# Patient Record
Sex: FEMALE | Race: WHITE | NOT HISPANIC OR LATINO
[De-identification: names, ages, dates, MRNs, and addresses within clinical notes are randomized per-mention and may not be internally consistent; named-entity substitution may affect disease eponyms.]

---
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
## 2019-04-23 ENCOUNTER — RX RENEWAL (OUTPATIENT)
Age: 19
End: 2019-04-23

## 2019-05-23 ENCOUNTER — RX RENEWAL (OUTPATIENT)
Age: 19
End: 2019-05-23

## 2019-06-04 ENCOUNTER — APPOINTMENT (OUTPATIENT)
Dept: PEDIATRIC NEUROLOGY | Facility: CLINIC | Age: 19
End: 2019-06-04
Payer: COMMERCIAL

## 2019-06-04 VITALS
HEART RATE: 61 BPM | BODY MASS INDEX: 18.41 KG/M2 | SYSTOLIC BLOOD PRESSURE: 101 MMHG | HEIGHT: 60.24 IN | WEIGHT: 95.02 LBS | DIASTOLIC BLOOD PRESSURE: 68 MMHG

## 2019-06-04 DIAGNOSIS — G40.909 EPILEPSY, UNSPECIFIED, NOT INTRACTABLE, W/OUT STATUS EPILEPTICUS: ICD-10-CM

## 2019-06-04 DIAGNOSIS — F41.9 ANXIETY DISORDER, UNSPECIFIED: ICD-10-CM

## 2019-06-04 DIAGNOSIS — F84.9 PERVASIVE DEVELOPMENTAL DISORDER, UNSPECIFIED: ICD-10-CM

## 2019-06-04 DIAGNOSIS — F90.9 ATTENTION-DEFICIT HYPERACTIVITY DISORDER, UNSPECIFIED TYPE: ICD-10-CM

## 2019-06-04 PROCEDURE — 99214 OFFICE O/P EST MOD 30 MIN: CPT

## 2019-06-04 RX ORDER — DROSPIRENONE AND ETHINYL ESTRADIOL 0.02-3(28)
3-0.02 KIT ORAL
Refills: 0 | Status: DISCONTINUED | COMMUNITY
End: 2019-06-04

## 2019-06-04 RX ORDER — CALCIUM LACTATE 100 MG
100 TABLET ORAL DAILY
Qty: 30 | Refills: 0 | Status: DISCONTINUED | COMMUNITY
Start: 2017-05-23 | End: 2019-06-04

## 2019-06-04 RX ORDER — RISPERIDONE 1 MG/1
1 TABLET, FILM COATED ORAL
Qty: 60 | Refills: 0 | Status: ACTIVE | COMMUNITY
Start: 2018-12-11

## 2019-06-04 RX ORDER — AMOXICILLIN 400 MG/5ML
400 FOR SUSPENSION ORAL
Qty: 200 | Refills: 0 | Status: DISCONTINUED | COMMUNITY
Start: 2019-02-04

## 2019-06-05 PROBLEM — F90.9 ADHD: Status: ACTIVE | Noted: 2018-07-27

## 2019-06-05 PROBLEM — F84.9 PDD (PERVASIVE DEVELOPMENTAL DISORDER): Status: ACTIVE | Noted: 2017-01-31

## 2019-06-05 PROBLEM — G40.909 EPILEPSY: Status: ACTIVE | Noted: 2017-01-31

## 2019-06-05 PROBLEM — F41.9 ANXIETY: Status: ACTIVE | Noted: 2018-07-27

## 2019-06-05 RX ORDER — RISPERIDONE 0.5 MG/1
TABLET ORAL
Refills: 0 | Status: DISCONTINUED | COMMUNITY
End: 2019-06-05

## 2019-06-05 NOTE — ASSESSMENT
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 No reports of seizure acitvity- EEG in 8/2018 normal.  Depakote was also felt to have mood stabilizing benefits. After weaning had a worsening in behavior which has now stabilized and psychiatry is recommending transition to Lamictal from VPA. Discussed with father slow transition from VPA to LTG as well as side effects of both when used in combination. \par \par Plan:\par Will continue on same dose of 500 mg AM/250 mg PM\par Recommend starting Lamictal per psych but advise to start slow ( 25mg every other day x 2 week, 25 mg daily x 2 weeks.  Assess levels- if therapeutic may continue taper of VPA 250mg every 2 weeks.  Increase Lamictal to maintenance dose of 100mg daily). \par Recommend monitoring ferritin levels.  If rash develops stop immediately. \par Continue Vit D and folic acid\par RTC 6 months\par

## 2019-06-05 NOTE — QUALITY MEASURES
[Seizure frequency] : Seizure frequency: Yes [Etiology, seizure type, and epilepsy syndrome] : Etiology, seizure type, and epilepsy syndrome: Yes [Side effects of anti-seizure medications] : Side effects of anti-seizure medications: Yes [Safety and education around seizures] : Safety and education around seizures: Yes [Screening for anxiety, depression] : Screening for anxiety, depression: Yes [Treatment-resistant epilepsy (every visit)] : Treatment-resistant epilepsy (every visit): Yes [Adherence to medication(s)] : Adherence to medication(s): Yes [Counseling for women of childbearing potential with epilepsy (including folic acid supplement)] : Counseling for women of childbearing potential with epilepsy (including folic acid supplement): Yes [Options for adjunctive therapy (Neurostimulation, CBD, Dietary Therapy, Epilepsy Surgery)] : Options for adjunctive therapy (Neurostimulation, CBD, Dietary Therapy, Epilepsy Surgery): Yes [25 Hydroxy Vitamin D level assessed and Vitamin D3 ordered] : 25 Hydroxy Vitamin D level assessed and Vitamin D3 ordered: Yes [Issues around driving] : Issues around driving: Not Applicable

## 2019-06-05 NOTE — HISTORY OF PRESENT ILLNESS
[FreeTextEntry1] : 18 year old with PDD, ADHD, and epilepsy. \par \par Her seizures started in 2014. She had a big convulsion where her arm was twitching when she suddenly collapsed and had a grand mal seizure. Prior to that she has been having seizures wherein her arms would jerk. She was placed on Depakote after that GTC seizure and has not had a seizure since.  REEG in 10/2017 normal . Last visit 7/2018. We had discussed repeating EEG and if normal slow wean of VPA.  EEG performed 8/2018 and was normal.  Dad decreased VPA to 500/250.  After a few weeks of being on lower dose Romana posted a video on youtube where she made comments about other people/ hurting them.  Psychiatry recommended holding wean with possibility of starting Lamictal in the future.  She continues to follow with psych every 3 months and remains on Risperidone and Buspar.  Mood is improvement. Sleeping well at night.  No complaints of headaches. No reports of seizure activity- no jerking, staring episodes. \par \par \par Meds:\par Depakote: 500/250\par Risperidone 0.5/0/5 \par  Buspirone 15 BID \par \par  She is in [employer] program and will stay there until 20.  She is in cooking class in am and then learn curriculum in afternoon.  She is able to cook independently but if problems arises has difficulty calming down.  She is very strict with routine- she wakes up 5am ( does not need to be at school till 8), comes home from school naps from 3-5, and then showers at 5pm.  She has difficulty deviating from routine. \par \par History Reviewed: \par In review of outside medical records REEG in 2014 was abnormal with bursts of generalized, high amplitude slowing with intermittent independent right and left spike waves. MRI of brain was normal in 2014. REEG in 2014 noted occasional, very brief non-lateralized spike and slow wave discharges seen maximal over the frontal- temporal region. VEEG 2015 was normal awake and asleep. 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 . \par \par She has anxiety and ADHD and is followed by a therapist. \par

## 2019-06-25 ENCOUNTER — RX RENEWAL (OUTPATIENT)
Age: 19
End: 2019-06-25

## 2019-06-25 RX ORDER — DIVALPROEX SODIUM 250 MG/1
250 TABLET, DELAYED RELEASE ORAL TWICE DAILY
Qty: 120 | Refills: 2 | Status: ACTIVE | COMMUNITY
Start: 2018-07-02 | End: 1900-01-01